# Patient Record
Sex: MALE | Race: WHITE
[De-identification: names, ages, dates, MRNs, and addresses within clinical notes are randomized per-mention and may not be internally consistent; named-entity substitution may affect disease eponyms.]

---

## 2021-05-24 ENCOUNTER — HOSPITAL ENCOUNTER (EMERGENCY)
Dept: HOSPITAL 53 - M ED | Age: 36
LOS: 1 days | Discharge: HOME | End: 2021-05-25
Payer: COMMERCIAL

## 2021-05-24 VITALS — WEIGHT: 200.42 LBS | HEIGHT: 67 IN | BODY MASS INDEX: 31.46 KG/M2
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
## 2021-05-24 DIAGNOSIS — F10.229: Primary | ICD-10-CM

## 2021-05-24 DIAGNOSIS — F17.210: ICD-10-CM

## 2021-05-24 DIAGNOSIS — S16.1XXA: ICD-10-CM
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
## 2021-05-24 LAB
ALBUMIN SERPL BCG-MCNC: 4.1 GM/DL (ref 3.2–5.2)
ALT SERPL W P-5'-P-CCNC: 37 U/L (ref 12–78)
AMPHETAMINES UR QL SCN: NEGATIVE
BARBITURATES UR QL SCN: NEGATIVE
BENZODIAZ UR QL SCN: NEGATIVE
BILIRUB CONJ SERPL-MCNC: 0.1 MG/DL (ref 0–0.2)
BILIRUB SERPL-MCNC: 0.4 MG/DL (ref 0.2–1)
BUN SERPL-MCNC: 10 MG/DL (ref 7–18)
BZE UR QL SCN: POSITIVE
CALCIUM SERPL-MCNC: 8.3 MG/DL (ref 8.5–10.1)
CANNABINOIDS UR QL SCN: NEGATIVE
CHLORIDE SERPL-SCNC: 105 MEQ/L (ref 98–107)
CO2 SERPL-SCNC: 23 MEQ/L (ref 21–32)
CREAT SERPL-MCNC: 0.9 MG/DL (ref 0.7–1.3)
ETHANOL SERPL-MCNC: 0.24 % (ref 0–0.01)
GFR SERPL CREATININE-BSD FRML MDRD: > 60 ML/MIN/{1.73_M2} (ref 60–?)
GLUCOSE SERPL-MCNC: 80 MG/DL (ref 70–100)
HCT VFR BLD AUTO: 41.4 % (ref 42–52)
HGB BLD-MCNC: 14.3 G/DL (ref 13.5–17.5)
MCH RBC QN AUTO: 31.4 PG (ref 27–33)
MCHC RBC AUTO-ENTMCNC: 34.5 G/DL (ref 32–36.5)
MCV RBC AUTO: 90.8 FL (ref 80–96)
METHADONE UR QL SCN: NEGATIVE
OPIATES UR QL SCN: NEGATIVE
PCP UR QL SCN: NEGATIVE
PLATELET # BLD AUTO: 287 10^3/UL (ref 150–450)
POTASSIUM SERPL-SCNC: 3.9 MEQ/L (ref 3.5–5.1)
PROT SERPL-MCNC: 7.1 GM/DL (ref 6.4–8.2)
RBC # BLD AUTO: 4.56 10^6/UL (ref 4.3–6.1)
SODIUM SERPL-SCNC: 139 MEQ/L (ref 136–145)
WBC # BLD AUTO: 5.8 10^3/UL (ref 4–10)
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
## 2021-05-25 VITALS — SYSTOLIC BLOOD PRESSURE: 124 MMHG | DIASTOLIC BLOOD PRESSURE: 72 MMHG

## 2021-10-01 ENCOUNTER — HOSPITAL ENCOUNTER (OUTPATIENT)
Dept: HOSPITAL 53 - M OUTALCOH | Age: 36
End: 2021-10-01
Attending: PSYCHIATRY & NEUROLOGY
Payer: SELF-PAY

## 2021-10-01 DIAGNOSIS — F10.10: ICD-10-CM

## 2021-10-01 DIAGNOSIS — Z13.39: Primary | ICD-10-CM

## 2021-10-27 ENCOUNTER — HOSPITAL ENCOUNTER (OUTPATIENT)
Dept: HOSPITAL 53 - M OUTALCOH | Age: 36
LOS: 4 days | End: 2021-10-31
Attending: PSYCHIATRY & NEUROLOGY
Payer: SELF-PAY

## 2021-10-27 DIAGNOSIS — Z72.0: ICD-10-CM

## 2021-10-27 DIAGNOSIS — F10.10: Primary | ICD-10-CM

## 2021-12-22 ENCOUNTER — HOSPITAL ENCOUNTER (OUTPATIENT)
Dept: HOSPITAL 53 - M OUTALCOH | Age: 36
LOS: 9 days | End: 2021-12-31
Attending: PSYCHIATRY & NEUROLOGY
Payer: SELF-PAY

## 2021-12-22 DIAGNOSIS — Z72.0: ICD-10-CM

## 2021-12-22 DIAGNOSIS — F10.10: Primary | ICD-10-CM

## 2022-01-19 ENCOUNTER — HOSPITAL ENCOUNTER (OUTPATIENT)
Dept: HOSPITAL 53 - M OUTALCOH | Age: 37
LOS: 12 days | End: 2022-01-31
Attending: PSYCHIATRY & NEUROLOGY
Payer: SELF-PAY

## 2022-01-19 DIAGNOSIS — Z72.0: ICD-10-CM

## 2022-01-19 DIAGNOSIS — F10.10: Primary | ICD-10-CM
